# Patient Record
Sex: FEMALE | Race: WHITE | NOT HISPANIC OR LATINO | Employment: FULL TIME | URBAN - METROPOLITAN AREA
[De-identification: names, ages, dates, MRNs, and addresses within clinical notes are randomized per-mention and may not be internally consistent; named-entity substitution may affect disease eponyms.]

---

## 2023-08-27 ENCOUNTER — HOSPITAL ENCOUNTER (EMERGENCY)
Facility: HOSPITAL | Age: 18
Discharge: HOME | End: 2023-08-27
Attending: EMERGENCY MEDICINE
Payer: COMMERCIAL

## 2023-08-27 VITALS
HEART RATE: 88 BPM | RESPIRATION RATE: 19 BRPM | DIASTOLIC BLOOD PRESSURE: 75 MMHG | SYSTOLIC BLOOD PRESSURE: 131 MMHG | OXYGEN SATURATION: 97 % | TEMPERATURE: 98 F

## 2023-08-27 DIAGNOSIS — J01.00 ACUTE NON-RECURRENT MAXILLARY SINUSITIS: Primary | ICD-10-CM

## 2023-08-27 LAB
FLUAV AG UPPER RESP QL IA.RAPID: NOT DETECTED
FLUBV AG UPPER RESP QL IA.RAPID: NOT DETECTED
SARS-COV-2 RNA RESP QL NAA+PROBE: NOT DETECTED
STREP A PCR (OHS): NOT DETECTED

## 2023-08-27 PROCEDURE — 87651 STREP A DNA AMP PROBE: CPT | Performed by: NURSE PRACTITIONER

## 2023-08-27 PROCEDURE — 99284 EMERGENCY DEPT VISIT MOD MDM: CPT

## 2023-08-27 PROCEDURE — 0240U COVID/FLU A&B PCR: CPT | Performed by: NURSE PRACTITIONER

## 2023-08-27 RX ORDER — FLUTICASONE PROPIONATE 50 MCG
1 SPRAY, SUSPENSION (ML) NASAL 2 TIMES DAILY PRN
Qty: 15 G | Refills: 0 | Status: SHIPPED | OUTPATIENT
Start: 2023-08-27

## 2023-08-27 RX ORDER — AMOXICILLIN AND CLAVULANATE POTASSIUM 875; 125 MG/1; MG/1
1 TABLET, FILM COATED ORAL EVERY 12 HOURS
Qty: 20 TABLET | Refills: 0 | Status: SHIPPED | OUTPATIENT
Start: 2023-08-27 | End: 2023-09-06

## 2023-08-27 RX ORDER — PREDNISONE 20 MG/1
40 TABLET ORAL DAILY
Qty: 10 TABLET | Refills: 0 | Status: SHIPPED | OUTPATIENT
Start: 2023-08-27 | End: 2023-09-01

## 2023-08-27 NOTE — DISCHARGE INSTRUCTIONS
Hydrate with lots of fluids. Increase vitamin C up to 3000mg per day. Continue taking zyrtec daily. Take prednisone as directed and use flonase.     Augmentin only to be filled and taken if symptoms are not improving at all in 3-5 days. If your symptoms are improving, then do not fill or take the antibiotic.

## 2023-08-27 NOTE — FIRST PROVIDER EVALUATION
Medical screening examination initiated.  I have conducted a focused provider triage encounter, findings are as follows:  Chief Complaint   Patient presents with    Headache     +Cough, sore throat, headache x 1 week.            Brief history of present illness:  16 y/o female presents with congestion, headache, sore throat for a week which she feels is worsening. She is taking zyrtec daily. She works in dog  for 2 months     There were no vitals filed for this visit.    Pertinent physical exam:  alert, nonlabored, ambulatory     Brief workup plan:  swabs    Preliminary workup initiated; this workup will be continued and followed by the physician or advanced practice provider that is assigned to the patient when roomed.

## 2023-08-27 NOTE — ED PROVIDER NOTES
Encounter Date: 8/27/2023       History     Chief Complaint   Patient presents with    Headache     +Cough, sore throat, headache x 1 week.        See MDM    The history is provided by the patient. No  was used.     Review of patient's allergies indicates:  No Known Allergies  No past medical history on file.  No past surgical history on file.  No family history on file.     Review of Systems   Constitutional:  Positive for fatigue.   HENT:  Positive for congestion, sinus pressure and sinus pain.    Respiratory:  Positive for cough.    Neurological:  Positive for headaches.   All other systems reviewed and are negative.      Physical Exam     Initial Vitals [08/27/23 1029]   BP Pulse Resp Temp SpO2   131/75 88 19 97.9 °F (36.6 °C) 97 %      MAP       --         Physical Exam    Nursing note and vitals reviewed.  Constitutional: She appears well-developed and well-nourished.   HENT:   Right Ear: Tympanic membrane and ear canal normal.   Left Ear: Tympanic membrane and ear canal normal.   Nose: Right sinus exhibits maxillary sinus tenderness and frontal sinus tenderness. Left sinus exhibits maxillary sinus tenderness and frontal sinus tenderness.   Mouth/Throat: No oropharyngeal exudate, posterior oropharyngeal edema, posterior oropharyngeal erythema or tonsillar abscesses.   Eyes: Conjunctivae are normal.   Cardiovascular:  Normal rate, regular rhythm and normal heart sounds.           Pulmonary/Chest: Breath sounds normal. No respiratory distress.     Neurological: She is alert and oriented to person, place, and time. She has normal strength.   Skin: Skin is warm and dry.   Psychiatric: She has a normal mood and affect.         ED Course   Procedures  Labs Reviewed   STREP GROUP A BY PCR - Normal    Narrative:     The Xpert Xpress Strep A test is a rapid, qualitative in vitro diagnostic test for the detection of Streptococcus pyogenes (Group A ß-hemolytic Streptococcus, Strep A) in throat swab  specimens from patients with signs and symptoms of pharyngitis.     COVID/FLU A&B PCR - Normal    Narrative:     The Xpert Xpress SARS-CoV-2/FLU/RSV plus is a rapid, multiplexed real-time PCR test intended for the simultaneous qualitative detection and differentiation of SARS-CoV-2, Influenza A, Influenza B, and respiratory syncytial virus (RSV) viral RNA in either nasopharyngeal swab or nasal swab specimens.                Imaging Results    None          Medications - No data to display  Medical Decision Making  16 y/o female who presents congestion, sinus pressure and cough for about 7 days with 3 days now of headache. No fever. 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 She is taking zyrtec daily.    She has sinus tenderness and has been for 7 days. Swabs neg. Will treat as sinusitis. Will hold off on abx currently and treat with prednisone and flonase and her to continue zyrtec. Discussed will write for abx for her to hold on to for another 3-5 days. If no improvement in symptoms then to start antibiotic but if symptoms are improving to not fill the antibiotic.     Amount and/or Complexity of Data Reviewed  Labs: ordered.     Details: Covid, flu, strep neg.      Additional MDM:   Differential Diagnosis:   Other: The following diagnoses were also considered and will be evaluated: sinusitis, URI and covid.                            Clinical Impression:   Final diagnoses:  [J01.00] Acute non-recurrent maxillary sinusitis (Primary)        ED Disposition Condition    Discharge Stable          ED Prescriptions       Medication Sig Dispense Start Date End Date Auth. Provider    fluticasone propionate (FLONASE) 50 mcg/actuation nasal spray 1 spray (50 mcg total) by Each Nostril route 2 (two) times daily as needed for Rhinitis. 15 g 8/27/2023 -- Lupe Moody FNP    predniSONE (DELTASONE) 20 MG tablet Take 2 tablets (40 mg total) by mouth once daily. for  5 days 10 tablet 8/27/2023 9/1/2023 Lupe Moody FNP    amoxicillin-clavulanate 875-125mg (AUGMENTIN) 875-125 mg per tablet Take 1 tablet by mouth every 12 (twelve) hours. for 10 days 20 tablet 8/27/2023 9/6/2023 Lupe Moody FNP          Follow-up Information       Follow up With Specialties Details Why Contact Info    primary care provider  Call in 1 week As needed, If symptoms worsen              Lupe Moody FNP  08/27/23 1615

## 2023-08-27 NOTE — LETTER
"Fozia Tongnancy Galarza was seen and treated in our emergency department on 8/27/2023.  She may return to work on 08/29/2023.       If you have any questions or concerns, please don't hesitate to call.      Lupe Moody, JULIANNA"

## 2024-12-09 ENCOUNTER — HOSPITAL ENCOUNTER (EMERGENCY)
Facility: HOSPITAL | Age: 19
Discharge: HOME | End: 2024-12-09
Attending: FAMILY MEDICINE
Payer: COMMERCIAL

## 2024-12-09 VITALS
TEMPERATURE: 99 F | RESPIRATION RATE: 18 BRPM | WEIGHT: 180 LBS | HEART RATE: 94 BPM | DIASTOLIC BLOOD PRESSURE: 65 MMHG | OXYGEN SATURATION: 95 % | BODY MASS INDEX: 33.13 KG/M2 | HEIGHT: 62 IN | SYSTOLIC BLOOD PRESSURE: 118 MMHG

## 2024-12-09 DIAGNOSIS — Z20.822 COUGH WITH EXPOSURE TO COVID-19 VIRUS: Primary | ICD-10-CM

## 2024-12-09 DIAGNOSIS — R05.8 COUGH WITH EXPOSURE TO COVID-19 VIRUS: Primary | ICD-10-CM

## 2024-12-09 DIAGNOSIS — R05.9 COUGH: ICD-10-CM

## 2024-12-09 PROCEDURE — 99283 EMERGENCY DEPT VISIT LOW MDM: CPT | Mod: 25

## 2024-12-09 RX ORDER — PREDNISONE 20 MG/1
20 TABLET ORAL 2 TIMES DAILY
COMMUNITY
Start: 2024-12-05

## 2024-12-09 RX ORDER — SERTRALINE HYDROCHLORIDE 50 MG/1
75 TABLET, FILM COATED ORAL
COMMUNITY
Start: 2024-09-24

## 2024-12-09 RX ORDER — AZITHROMYCIN 250 MG/1
250 TABLET, FILM COATED ORAL DAILY
COMMUNITY
Start: 2024-12-05

## 2024-12-09 NOTE — Clinical Note
"Fozia Tongnancy Galarza was seen and treated in our emergency department on 12/9/2024.  She may return to work on 12/16/2024.       If you have any questions or concerns, please don't hesitate to call.      Alverto Carias MD"

## 2024-12-10 NOTE — ED PROVIDER NOTES
Encounter Date: 12/9/2024       History     Chief Complaint   Patient presents with    Back Pain    Shortness of Breath    Dizziness     Back pain, SOB, dizziness, fatigue, cough.  Diagnosed with pneumonia/covid 3 weeks ago, and not getting any better.       This patient is a 19-year-old female who states that she was diagnosed with pneumonia 3 weeks ago, went to the walk-in clinic 5 days ago because she was not getting better and tested positive for COVID.  Patient states she was given a Z-Jared, prednisone, Proventil, and cough syrup and she is still not better today.  States she has a bad cough and she feels short of breath.  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    The history is provided by the patient.     Review of patient's allergies indicates:  No Known Allergies  History reviewed. No pertinent past medical history.  No past surgical history on file.  No family history on file.     Review of Systems   Constitutional: Negative.    Respiratory:  Positive for cough and shortness of breath.    Cardiovascular: Negative.    Gastrointestinal: Negative.    Endocrine: Negative.    Genitourinary: Negative.    Musculoskeletal: Negative.    Skin: Negative.    Neurological: Negative.    Hematological: Negative.    Psychiatric/Behavioral: Negative.     All other systems reviewed and are negative.      Physical Exam     Initial Vitals [12/09/24 1745]   BP Pulse Resp Temp SpO2   (!) 156/99 (!) 117 18 98.4 °F (36.9 °C) 95 %      MAP       --         Physical Exam    Nursing note and vitals reviewed.  Constitutional: She appears well-developed.   HENT:   Head: Normocephalic.   Eyes: Pupils are equal, round, and reactive to light.   Neck:   Normal range of motion.  Cardiovascular:  Normal rate, regular rhythm and normal heart sounds.           Pulmonary/Chest: Effort normal. She has wheezes in the right lower field and the left lower field.   Abdominal: Abdomen is soft.    Musculoskeletal:         General: Normal range of motion.      Cervical back: Normal range of motion.     Neurological: She is alert and oriented to person, place, and time. GCS score is 15. GCS eye subscore is 4. GCS verbal subscore is 5. GCS motor subscore is 6.   Skin: Skin is warm and dry.   Psychiatric: She has a normal mood and affect.         ED Course   Procedures  Labs Reviewed - No data to display       Imaging Results              X-Ray Chest AP Portable (Final result)  Result time 12/09/24 17:54:52      Final result by Pranav Arnold MD (12/09/24 17:54:52)                   Impression:      No acute findings.      Electronically signed by: Pranav Arnold  Date:    12/09/2024  Time:    17:54               Narrative:    EXAMINATION:  XR CHEST AP PORTABLE    CLINICAL HISTORY:  Cough, unspecified    COMPARISON:  No priors    FINDINGS:  Frontal view of the chest was obtained. The heart is not enlarged.  Lungs are clear.  There is no pneumothorax or significant effusion.                                       Medications - No data to display  Medical Decision Making  This patient is a 19-year-old female who comes in with a chronic cough.  Patient states that 3 weeks ago she was diagnosed with pneumonia and treated.  Five days ago she went back to the clinic and tested positive for COVID.  Patient was given several medicines and states that she still not feeling better  Differential diagnosis: COVID, bronchitis, pneumonia    Amount and/or Complexity of Data Reviewed  Radiology: ordered.     Details: X-ray done of the patient's lungs show that there are no acute findings                                      Clinical Impression:  Final diagnoses:  [R05.9] Cough  [R05.8, Z20.822] Cough with exposure to COVID-19 virus (Primary)          ED Disposition Condition    Discharge Stable          ED Prescriptions    None       Follow-up Information       Follow up With Specialties Details Why Contact Info    PCP   Schedule an appointment as soon as possible for a visit in 2 days               Alverto Carias MD  12/09/24 [id number]

## 2025-06-30 ENCOUNTER — OFFICE VISIT (OUTPATIENT)
Dept: URGENT CARE | Facility: CLINIC | Age: 20
End: 2025-06-30
Payer: COMMERCIAL

## 2025-06-30 VITALS
HEIGHT: 62 IN | HEART RATE: 94 BPM | RESPIRATION RATE: 18 BRPM | SYSTOLIC BLOOD PRESSURE: 134 MMHG | TEMPERATURE: 99 F | OXYGEN SATURATION: 98 % | WEIGHT: 180 LBS | BODY MASS INDEX: 33.13 KG/M2 | DIASTOLIC BLOOD PRESSURE: 91 MMHG

## 2025-06-30 DIAGNOSIS — J02.9 PHARYNGITIS, UNSPECIFIED ETIOLOGY: ICD-10-CM

## 2025-06-30 DIAGNOSIS — J02.9 SORE THROAT: Primary | ICD-10-CM

## 2025-06-30 DIAGNOSIS — R59.1 LYMPHADENOPATHY: ICD-10-CM

## 2025-06-30 LAB
CTP QC/QA: YES
MOLECULAR STREP A: NEGATIVE

## 2025-06-30 PROCEDURE — 87651 STREP A DNA AMP PROBE: CPT | Mod: QW,,, | Performed by: NURSE PRACTITIONER

## 2025-06-30 PROCEDURE — 99214 OFFICE O/P EST MOD 30 MIN: CPT | Mod: ,,, | Performed by: NURSE PRACTITIONER

## 2025-06-30 RX ORDER — AMOXICILLIN 875 MG/1
875 TABLET, COATED ORAL 2 TIMES DAILY
Qty: 14 TABLET | Refills: 0 | Status: SHIPPED | OUTPATIENT
Start: 2025-06-30 | End: 2025-07-07

## 2025-06-30 RX ORDER — PREDNISONE 20 MG/1
20 TABLET ORAL 2 TIMES DAILY
Qty: 10 TABLET | Refills: 0 | Status: SHIPPED | OUTPATIENT
Start: 2025-06-30 | End: 2025-07-05

## 2025-06-30 NOTE — PROGRESS NOTES
"Subjective:      Patient ID: Fozia Galarza is a 19 y.o. female.    Vitals:  height is 5' 2" (1.575 m) and weight is 81.6 kg (180 lb). Her oral temperature is 98.7 °F (37.1 °C). Her blood pressure is 134/91 (abnormal) and her pulse is 94. Her respiration is 18 and oxygen saturation is 98%.     Chief Complaint: No chief complaint on file.     Patient is a 19 y.o. female who presents to urgent care with complaints of right sided sore throat with right ear pain x 4 days. Patient denies fever.  Hesitant to use over-the-counter medication without any alleviation of symptoms only worsening over the past 24-48 hours states right side of her throat and tonsil region extending to her right jaw line and ear canal feels very painful and tender    ROS   Objective:     Physical Exam   Constitutional: She is oriented to person, place, and time. She appears well-developed. She is cooperative.  Non-toxic appearance. She does not appear ill. No distress.   HENT:   Head: Normocephalic and atraumatic.   Ears:   Right Ear: Hearing, tympanic membrane, external ear and ear canal normal.   Left Ear: Hearing, tympanic membrane, external ear and ear canal normal.   Nose: Nose normal. No mucosal edema, rhinorrhea or nasal deformity. No epistaxis. Right sinus exhibits no maxillary sinus tenderness and no frontal sinus tenderness. Left sinus exhibits no maxillary sinus tenderness and no frontal sinus tenderness.   Mouth/Throat: Uvula is midline and mucous membranes are normal. No trismus in the jaw. Normal dentition. No uvula swelling. Posterior oropharyngeal erythema present. No oropharyngeal exudate or posterior oropharyngeal edema. No tonsillar exudate.   Eyes: Conjunctivae and lids are normal. No scleral icterus.   Neck: Trachea normal and phonation normal. Neck supple. No edema present. No erythema present. No neck rigidity present.   Cardiovascular: Normal rate, regular rhythm, normal heart sounds and normal pulses. "   Pulmonary/Chest: Effort normal and breath sounds normal. No respiratory distress. She has no decreased breath sounds. She has no rhonchi.   Abdominal: Normal appearance.   Musculoskeletal: Normal range of motion.         General: No deformity. Normal range of motion.   Lymphadenopathy:     She has cervical adenopathy.   Neurological: She is alert and oriented to person, place, and time. She exhibits normal muscle tone. Coordination normal.   Skin: Skin is warm, dry, intact, not diaphoretic and not pale.   Psychiatric: Her speech is normal and behavior is normal. Judgment and thought content normal.   Nursing note and vitals reviewed.      Assessment:     1. Sore throat    2. Pharyngitis, unspecified etiology    3. Lymphadenopathy        Plan:   Take 600 mg of ibuprofen every 6 hours as needed for pain along with prescribed prednisone   If symptoms do not improve in the next 48 hours begin taking oral antibiotics   If symptoms also worsen or do not improve after administering antibiotics please have this site re-evaluated   Call with any questions or concerns otherwise follow up with PCP as needed    Sore throat  -     POCT Strep A, Molecular    Pharyngitis, unspecified etiology    Lymphadenopathy    Other orders  -     amoxicillin (AMOXIL) 875 MG tablet; Take 1 tablet (875 mg total) by mouth 2 (two) times daily. for 7 days  Dispense: 14 tablet; Refill: 0  -     predniSONE (DELTASONE) 20 MG tablet; Take 1 tablet (20 mg total) by mouth 2 (two) times daily. for 5 days  Dispense: 10 tablet; Refill: 0

## 2025-06-30 NOTE — PATIENT INSTRUCTIONS
Take 600 mg of ibuprofen every 6 hours as needed for pain along with prescribed prednisone   If symptoms do not improve in the next 48 hours begin taking oral antibiotics   If symptoms also worsen or do not improve after administering antibiotics please have this site re-evaluated   Call with any questions or concerns otherwise follow up with PCP as needed

## 2025-07-01 ENCOUNTER — TELEPHONE (OUTPATIENT)
Dept: URGENT CARE | Facility: CLINIC | Age: 20
End: 2025-07-01
Payer: COMMERCIAL

## 2025-07-01 NOTE — TELEPHONE ENCOUNTER
----- Message from Jim Galeano NP sent at 7/1/2025  4:52 PM CDT -----  Regarding: RE: Work excuse  Sure  ----- Message -----  From: Erica Herbert LPN  Sent: 7/1/2025   4:18 PM CDT  To: [facility] Results  Subject: Work excuse                                      Pt was seen yesterday 6/30/25 and diagnosed with pharyngitis and lymphoadenopathy. Pt is asking for a work excuse for tomorrow. Please advise.

## 2025-07-01 NOTE — TELEPHONE ENCOUNTER
"Pt informed that we will do an excuse for 7/2/25 to return 7/3/25. Clarified that pt does not need excuse for today 7/1/25. Pt states she went to work today 7/1/25. Pt states she feels worse and her throat is swollen. Clarified with pt that she is not having difficulty breathing or throat closing. Pt states "no my throat just hurts". Pt informed that if she is having difficulty breathing or throat swelling to report to er immediately. Pt voiced thanks and understanding with no further questions.  "